# Patient Record
Sex: FEMALE | Race: OTHER | Employment: FULL TIME
[De-identification: names, ages, dates, MRNs, and addresses within clinical notes are randomized per-mention and may not be internally consistent; named-entity substitution may affect disease eponyms.]

---

## 2023-08-08 ENCOUNTER — TELEPHONE (OUTPATIENT)
Facility: HOSPITAL | Age: 33
End: 2023-08-08

## 2023-08-08 NOTE — TELEPHONE ENCOUNTER
I attempted to reach patient by phone today to discuss upcoming MRI/MRE scheduled for 8/11/23, but there was no answer so I left a voicemail message requesting a call back.      Anna Resendiz RN  [facility]

## 2023-08-09 ENCOUNTER — TELEPHONE (OUTPATIENT)
Facility: HOSPITAL | Age: 33
End: 2023-08-09

## 2023-08-09 NOTE — TELEPHONE ENCOUNTER
Today I called patient to give instructions for upcoming MRI with enterography, which is scheduled for 8/11/23. I explained that patient will need to be NPO x 4-6 hours the morning of the MRE, the patient will need to drink Breeza oral solution upon arriving for the scan, and the patient will also be given IV contrast during the scan. Patient agreed to arrive to register patient in [facility] at [address] [facility], and I asked that patient arrive dressed in clothing without metal, with short or loose sleeves, and without jewelry. I also informed patient that total encounter time will be approximately two hours. Patient voiced understanding.      Lorrayne Schaumann, RN  [facility]

## 2023-08-11 ENCOUNTER — HOSPITAL ENCOUNTER (OUTPATIENT)
Facility: HOSPITAL | Age: 33
Discharge: HOME | End: 2023-08-11
Payer: COMMERCIAL

## 2023-08-11 VITALS — WEIGHT: 220 LBS

## 2023-08-11 DIAGNOSIS — K50.90 CROHN'S DISEASE IN REMISSION (HCC): ICD-10-CM

## 2023-08-11 PROCEDURE — 2709999900 MRI ENTEROGRAPHY

## 2023-08-11 PROCEDURE — 2580000003 HC RX 258: Performed by: PHYSICIAN ASSISTANT

## 2023-08-11 PROCEDURE — A9579 GAD-BASE MR CONTRAST NOS,1ML: HCPCS

## 2023-08-11 PROCEDURE — 6360000002 HC RX W HCPCS

## 2023-08-11 PROCEDURE — 6360000004 HC RX CONTRAST MEDICATION

## 2023-08-11 RX ORDER — 0.9 % SODIUM CHLORIDE 0.9 %
100 INTRAVENOUS SOLUTION INTRAVENOUS ONCE
Status: COMPLETED | OUTPATIENT
Start: 2023-08-11 | End: 2023-08-11

## 2023-08-11 RX ORDER — SODIUM CHLORIDE 0.9 % (FLUSH) 0.9 %
10 SYRINGE (ML) INJECTION ONCE
Status: DISCONTINUED | OUTPATIENT
Start: 2023-08-11 | End: 2023-08-15 | Stop reason: HOSPADM

## 2023-08-11 RX ORDER — IBUPROFEN 600 MG/1
TABLET ORAL
Status: COMPLETED
Start: 2023-08-11 | End: 2023-08-11

## 2023-08-11 RX ADMIN — GADOTERIDOL 20 ML: 279.3 INJECTION, SOLUTION INTRAVENOUS at 11:15

## 2023-08-11 RX ADMIN — SODIUM CHLORIDE 100 ML: 900 INJECTION, SOLUTION INTRAVENOUS at 11:15

## 2023-08-11 RX ADMIN — GLUCAGON 0.4 MG: KIT at 11:23

## 2023-08-11 NOTE — PROGRESS NOTES
Patient came to MRI today for outpatient MRI enterography scan. Patient drank 2.5 bottles (500 ml each) of Breeza oral solution upon arrival. Patient tolerated prep & MRE fairly well, with c/o mild nausea, but no other complaints. Patient was informed that s/he was given a medication called Glucagon during the MRI scan, and was advised to return slowly to eating/drinking and to avoid high sugar/starchy foods over the next hour. Patient was also advised to drink plenty of water throughout the day today in order to flush out the Gadolinium IV contrast, and to report significant nausea and/or vomiting to his/her medical provider. Patient voiced understanding of instructions and was pink, alert, and in NAD when s/he ambulated from the MRI Department.      Roman Jain RN  [address] MRI